# Patient Record
Sex: FEMALE | Employment: OTHER
[De-identification: names, ages, dates, MRNs, and addresses within clinical notes are randomized per-mention and may not be internally consistent; named-entity substitution may affect disease eponyms.]

---

## 2017-08-05 ENCOUNTER — HEALTH MAINTENANCE LETTER (OUTPATIENT)
Age: 42
End: 2017-08-05

## 2017-08-11 ENCOUNTER — THERAPY VISIT (OUTPATIENT)
Dept: PHYSICAL THERAPY | Facility: CLINIC | Age: 42
End: 2017-08-11
Payer: COMMERCIAL

## 2017-08-11 DIAGNOSIS — G89.29 CHRONIC BILATERAL LOW BACK PAIN WITH RIGHT-SIDED SCIATICA: Primary | ICD-10-CM

## 2017-08-11 DIAGNOSIS — M54.41 CHRONIC BILATERAL LOW BACK PAIN WITH RIGHT-SIDED SCIATICA: Primary | ICD-10-CM

## 2017-08-11 PROCEDURE — 97161 PT EVAL LOW COMPLEX 20 MIN: CPT | Mod: GP | Performed by: PHYSICAL THERAPIST

## 2017-08-11 PROCEDURE — 97530 THERAPEUTIC ACTIVITIES: CPT | Mod: GP | Performed by: PHYSICAL THERAPIST

## 2017-08-11 PROCEDURE — 97110 THERAPEUTIC EXERCISES: CPT | Mod: GP | Performed by: PHYSICAL THERAPIST

## 2017-08-11 NOTE — MR AVS SNAPSHOT
After Visit Summary   8/11/2017    Diane Sanders    MRN: [id number]           Patient Information     Date Of Birth          1975        Visit Information        Provider Department      8/11/2017 10:20 AM Kalani Trotter, PT [facility]        Today's Diagnoses     Chronic bilateral low back pain with right-sided sciatica    -  1       Follow-ups after your visit        Your next 10 appointments already scheduled     Aug 15, 2017  3:40 PM CDT   GERALDO Spine with Akanksha Lee, PT   [facility] ([facility])    [address]  [facility] [id number] [phone]            Aug 22, 2017  4:00 PM CDT   GERALDO Spine with William Devine, PT   [facility] ([facility])    [address]  [facility] [id number] [phone]              Who to contact     If you have questions or need follow up information about today's clinic visit or your schedule please [facility] directly at [phone].  Normal or non-critical lab and imaging results will be communicated to you by MyChart, letter or phone within 4 business days after the clinic has received the results. If you do not hear from us within 7 days, please contact the clinic through [employer] or phone. If you have a critical or abnormal lab result, we will notify you by phone as soon as possible.  Submit refill requests through [employer] or call your pharmacy and they will forward the refill request to us. Please allow 3 business days for your refill to be completed.          Additional Information About Your Visit        [employer] Information     [employer] gives you secure access to your electronic health record. If you see a primary care provider, you can also send messages to your care team and make  appointments. If you have questions, please call your primary care clinic.  If you do not have a primary care provider, please call [phone] and they will assist you.        Care EveryWhere ID     This is your Care EveryWhere ID. This could be used by other organizations to access your [facility] medical records  [id number]         Blood Pressure from Last 3 Encounters:   05/15/12 102/70   03/26/12 110/70   03/12/12 108/68    Weight from Last 3 Encounters:   05/15/12 98.6 kg (217 lb 4.8 oz)   03/26/12 100.1 kg (220 lb 11.2 oz)   03/12/12 100.6 kg (221 lb 12.8 oz)              We Performed the Following     HC PT EVAL, LOW COMPLEXITY     THERAPEUTIC ACTIVITIES     THERAPEUTIC EXERCISES        Primary Care Provider    None Specified       No primary provider on file.        Equal Access to Services     BABATUNDE PHELPS : Cher Ash, leticia tenorio, rowena grubbs, aly gonzales . So [facility] [phone].    ATENCIÓN: Si habla español, tiene a shanks disposición servicios gratuitos de asistencia lingüística. Roland al [phone].    We comply with applicable federal civil rights laws and [place] laws. We do not discriminate on the basis of race, color, national origin, age, disability sex, sexual orientation or gender identity.            Thank you!     Thank you for choosing [facility]  for your care. Our goal is always to provide you with excellent care. Hearing back from our patients is one way we can continue to improve our services. Please take a few minutes to complete the written survey that you may receive in the mail after your visit with us. Thank you!             Your Updated Medication List - Protect others around you: Learn how to safely use, store and throw away your medicines at www.disposemymeds.org.          This list is accurate as of: 8/11/17  2:03 PM.  Always use your most recent med list.                    Brand Name Dispense Instructions for use Diagnosis    buPROPion 300 MG 24 hr tablet    WELLBUTRIN XL    90 tablet    Take 1 tablet by mouth every morning.    Anxiety and depression       CALCIUM + D PO      Take  by mouth 2 times daily.        cyanocobalamin 1000 MCG Subl      Place 1 tablet under the tongue daily.        FERROUS BISGLYCINATE CHELATE PO      Take 20 mg by mouth daily. Ferrous glycinate        fish oil-omega-3 fatty acids 1000 MG capsule      Take 2 g by mouth daily.        MULTIVITAMIN TABS   OR     0    1 tablet daily    Other abnormal glucose       vitamin D 2000 UNITS Caps      Take 1 tablet by mouth.

## 2017-08-11 NOTE — PROGRESS NOTES
[facility] Initial Evaluation    Subjective:    Patient is a 42 year old female presenting with rehab back hpi. The history is provided by the patient. No  was used.   Diane Sanders is a 42 year old female with a lumbar condition.  Condition occurred with:  Insidious onset.  Condition occurred: for unknown reasons.  This is a chronic condition  Pt reports that in May 2016 she was having significant pain and went to ED and got MRI showing spondylolisthesis, went to see neurosurgeon and tried PT and physicians neck and back with excellent results and she did well through the past year.  Feels that her back is getting achy and she is getting sciatica on R side.  Pt has has a couple of episodes of forward bending where her body has a catch and twinge of pain, achiness can come down to back of R calf.  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  Chief complaints: pain in evening and pain with sleeping (must lay on R side with pillow between knees).  .    Patient reports pain:  Central lumbar spine, lumbar spine right, lower thoracic spine, lumbar spine left, mid lumbar spine and lower lumbar spine.  Radiates to:  Gluteals right, thigh right, knee right, lower leg right and foot right (wraps to inside of thigh and calf).  Pain is described as aching and sharp and is constant and reported as 2/10.  Associated symptoms:  Loss of motion/stiffness and loss of strength. Worse during: activity/position dependent.  Symptoms are exacerbated by bending, standing, lying down, certain positions, lifting and carrying (repetitive tasks at work with hair styling) and relieved by ice (activity/movement- stretching, swimming).  Since onset symptoms are unchanged.  Special tests:  MRI (MRI May 2016, Grade II/III sponylolithesis L4 on L5).  Previous treatment  includes physical therapy.  There was significant improvement following previous treatment.  General health as reported by patient is good.    Medical allergies: yes (sulfa).  Other surgeries include:  Other (cardiac ablation, L thyroid removal, gastric bypass, ).  Current medications:  Anti-depressants.  Current occupation is Stylist and student.  Patient is working in normal job without restrictions.  Primary job tasks include:  Repetitive tasks and prolonged standing (computer work, pushing, pulling).    Barriers include:  None as reported by the patient.    Red flags:  None as reported by the patient.                        Objective:    Standing Alignment:        Lumbar:  Lordosis decr                           Lumbar/SI Evaluation    Lumbar Myotomes:    T12-L3 (Hip Flex):  Right: 5  L2-4 (Quads):  Right:  5  L4 (Ankle DF):  Right:  5  L5 (Great Toe Ext): Right: 5   S1 (Toe Raise):  Right: 5  Lumbar DTR's:    L4 (Quad):  Right:  2      Lumbar Dermtomes:  Lumbar dermatomes: pt denies n/t.                                                                         [place] Lumbar Evaluation    Posture:  Sitting: fair  Standing: fair  Lordosis: Reduced  Lateral Shift: no  Correction of Posture: better    Movement Loss:  Flexion (Flex): min and pain  Extension (EXT): mod and pain  Side [place] R (SG R): nil  Side Glide L (SG L): nil  Test Movements:        EIL: During: decreases  After: better  Mechanical Response: IncROM        Static Tests:          Lying Prone in Extension: prone lying produced calf sx, addition of 2 pillow under hips abolished calf sx and decreased R posterior thigh sx    Conclusion: derangement  Principle of Treatment:  Posture Correction: consistent use of lumbar roll in sitting, rec'd alternating foot up on stool with prolonged standing     Extension: prone lying over 2 pillows, progressing to no pillows as able    Other: Avoidance of excessive extension d/t L4-5 spondylolisthesis,  however flexion is direction of pain and historically the movement that increases pain with bending, lifting, prolonged standing with slight bend                                       ROS    Assessment/Plan:      Patient is a 42 year old female with lumbar complaints.    Patient has the following significant findings with corresponding treatment plan.                Diagnosis 1:  B LBP with R-sided sciatica  Pain -  hot/cold therapy, manual therapy, self management, education, directional preference exercise and home program  Decreased ROM/flexibility - manual therapy, therapeutic exercise and home program  Decreased joint mobility - manual therapy, therapeutic exercise and home program  Impaired muscle performance - neuro re-education and home program  Decreased function - therapeutic activities and home program  Impaired posture - neuro re-education and home program    Therapy Evaluation Codes:   1) History comprised of:   Personal factors that impact the plan of care:      None.    Comorbidity factors that impact the plan of care are:      spondylolisthesis L4-5, grade II-III.     Medications impacting care: None.  2) Examination of Body Systems comprised of:   Body structures and functions that impact the plan of care:      Lumbar spine.   Activity limitations that impact the plan of care are:      Bending, Driving, Lifting, Standing, Working, Sleeping and Laying down.  3) Clinical presentation characteristics are:   Stable/Uncomplicated.  4) Decision-Making    Low complexity using standardized patient assessment instrument and/or measureable assessment of functional outcome.  Cumulative Therapy Evaluation is: Low complexity.    Previous and current functional limitations:  (See Goal Flow Sheet for this information)    Short term and Long term goals: (See Goal Flow Sheet for this information)     Communication ability:  Patient appears to be able to clearly communicate and understand verbal and written  communication and follow directions correctly.  Treatment Explanation - The following has been discussed with the patient:   RX ordered/plan of care  Anticipated outcomes  Possible risks and side effects  This patient would benefit from PT intervention to resume normal activities.   Rehab potential is good.    Frequency:  1 X week, once daily  Duration:  for 8 weeks  Discharge Plan:  Achieve all LTG.  Independent in home treatment program.  Reach maximal therapeutic benefit.    Please refer to the daily flowsheet for treatment today, total treatment time and time spent performing 1:1 timed codes.

## 2018-01-12 PROBLEM — G89.29 CHRONIC BILATERAL LOW BACK PAIN WITH RIGHT-SIDED SCIATICA: Status: RESOLVED | Noted: 2017-08-11 | Resolved: 2018-01-12

## 2018-01-12 PROBLEM — M54.41 CHRONIC BILATERAL LOW BACK PAIN WITH RIGHT-SIDED SCIATICA: Status: RESOLVED | Noted: 2017-08-11 | Resolved: 2018-01-12

## 2018-01-12 NOTE — PROGRESS NOTES
Diane did not return to PT for follow up visits.  Current status is unknown.  Discharge at this time.

## 2018-08-12 ENCOUNTER — HEALTH MAINTENANCE LETTER (OUTPATIENT)
Age: 43
End: 2018-08-12

## 2019-11-05 ENCOUNTER — HEALTH MAINTENANCE LETTER (OUTPATIENT)
Age: 44
End: 2019-11-05

## 2020-11-22 ENCOUNTER — HEALTH MAINTENANCE LETTER (OUTPATIENT)
Age: 45
End: 2020-11-22

## 2021-02-13 ENCOUNTER — HEALTH MAINTENANCE LETTER (OUTPATIENT)
Age: 46
End: 2021-02-13

## 2021-09-19 ENCOUNTER — HEALTH MAINTENANCE LETTER (OUTPATIENT)
Age: 46
End: 2021-09-19

## 2022-01-09 ENCOUNTER — HEALTH MAINTENANCE LETTER (OUTPATIENT)
Age: 47
End: 2022-01-09

## 2022-03-06 ENCOUNTER — HEALTH MAINTENANCE LETTER (OUTPATIENT)
Age: 47
End: 2022-03-06

## 2022-11-21 ENCOUNTER — HEALTH MAINTENANCE LETTER (OUTPATIENT)
Age: 47
End: 2022-11-21

## 2023-04-16 ENCOUNTER — HEALTH MAINTENANCE LETTER (OUTPATIENT)
Age: 48
End: 2023-04-16